# Patient Record
Sex: MALE | Race: WHITE | URBAN - METROPOLITAN AREA
[De-identification: names, ages, dates, MRNs, and addresses within clinical notes are randomized per-mention and may not be internally consistent; named-entity substitution may affect disease eponyms.]

---

## 2023-03-07 ENCOUNTER — OFFICE VISIT (OUTPATIENT)
Dept: URBAN - METROPOLITAN AREA CLINIC 27 | Facility: CLINIC | Age: 68
End: 2023-03-07
Payer: MEDICARE

## 2023-03-07 DIAGNOSIS — H33.321 ROUND HOLE OF RETINA OF RIGHT EYE: ICD-10-CM

## 2023-03-07 DIAGNOSIS — H35.341 MACULAR CYST, HOLE, OR PSEUDOHOLE, RIGHT EYE: Primary | ICD-10-CM

## 2023-03-07 DIAGNOSIS — H35.342 MACULAR CYST, HOLE, OR PSEUDOHOLE, LEFT EYE: ICD-10-CM

## 2023-03-07 DIAGNOSIS — H25.13 AGE-RELATED NUCLEAR CATARACT, BILATERAL: ICD-10-CM

## 2023-03-07 PROCEDURE — 92134 CPTRZ OPH DX IMG PST SGM RTA: CPT | Performed by: OPHTHALMOLOGY

## 2023-03-07 PROCEDURE — 99214 OFFICE O/P EST MOD 30 MIN: CPT | Performed by: OPHTHALMOLOGY

## 2023-03-07 ASSESSMENT — INTRAOCULAR PRESSURE
OD: 18
OS: 15

## 2023-03-07 NOTE — IMPRESSION/PLAN
Impression: Age-related nuclear cataract, bilateral: H25.13 OU.  Plan: --needs CE/IOL OS later this year with Dr. Crawford Most

## 2023-03-07 NOTE — IMPRESSION/PLAN
Impression: Macular cyst, hole, or pseudohole, right eye: H35.341. Plan: --exam/OCT confirm worsening lamellar MH OD
--findings/diagnosis discussed with pt in detail --rec surgery to close [facility] and improve vision
--r/b/a of PPV/MP/AIR discussed with patient
--potential risks inc bleeding, pain, infection, vision loss
--post-op altitude restrictions and positioning reviewed --pt elects to proceed with surgery SURGICAL PLAN: 25G PPV/MP/AIR OD

## 2023-03-07 NOTE — IMPRESSION/PLAN
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
s/p laser 12/17/19 Plan: --exam confirms well-treated peripheral retinal hole x 2 OD
--RDW discussed

## 2023-03-07 NOTE — IMPRESSION/PLAN
Impression: Macular cyst, hole, or pseudohole, left eye: H35.342 OS. 
s/p  PPV MP GAS OS 06/25/19 Plan: --exam and OCT continue to show stable macular hole closure

## 2023-03-28 ENCOUNTER — POST-OPERATIVE VISIT (OUTPATIENT)
Dept: URBAN - METROPOLITAN AREA CLINIC 41 | Facility: CLINIC | Age: 68
End: 2023-03-28
Payer: MEDICARE

## 2023-03-28 DIAGNOSIS — Z48.810 ENCOUNTER FOR SURGICAL AFTERCARE FOLLOWING SURGERY ON THE SENSE ORGANS: ICD-10-CM

## 2023-03-28 DIAGNOSIS — H33.321 ROUND HOLE OF RETINA OF RIGHT EYE: Primary | ICD-10-CM

## 2023-03-28 PROCEDURE — 99024 POSTOP FOLLOW-UP VISIT: CPT | Performed by: STUDENT IN AN ORGANIZED HEALTH CARE EDUCATION/TRAINING PROGRAM

## 2023-03-28 ASSESSMENT — INTRAOCULAR PRESSURE
OD: 20
OS: 14

## 2023-03-28 NOTE — IMPRESSION/PLAN
Impression: S/P 25G PPV/MP/AIR OD - 14 Days. Round hole of retina of right eye  H33.321. Plan: -doing well s/p repair
-patient stopped drops prematurely
-restart PF BID x 1 week, then q day x 1 week OD, then stop
-return precautions discussed RTC:  1 month DFE / OCT OU (CHERIE)

## 2023-11-22 PROCEDURE — 99213 OFFICE O/P EST LOW 20 MIN: CPT | Performed by: OPHTHALMOLOGY

## 2023-11-22 PROCEDURE — 92134 CPTRZ OPH DX IMG PST SGM RTA: CPT | Performed by: OPHTHALMOLOGY
